# Patient Record
Sex: FEMALE | Race: WHITE | URBAN - METROPOLITAN AREA
[De-identification: names, ages, dates, MRNs, and addresses within clinical notes are randomized per-mention and may not be internally consistent; named-entity substitution may affect disease eponyms.]

---

## 2017-06-14 ENCOUNTER — OFFICE VISIT (OUTPATIENT)
Dept: INTERNAL MEDICINE | Facility: CLINIC | Age: 62
End: 2017-06-14
Payer: COMMERCIAL

## 2017-06-14 VITALS
HEART RATE: 91 BPM | BODY MASS INDEX: 18.37 KG/M2 | WEIGHT: 124 LBS | OXYGEN SATURATION: 97 % | DIASTOLIC BLOOD PRESSURE: 67 MMHG | SYSTOLIC BLOOD PRESSURE: 112 MMHG | HEIGHT: 69 IN | TEMPERATURE: 99.9 F

## 2017-06-14 DIAGNOSIS — K21.9 GASTROESOPHAGEAL REFLUX DISEASE, ESOPHAGITIS PRESENCE NOT SPECIFIED: ICD-10-CM

## 2017-06-14 DIAGNOSIS — F10.90 ALCOHOL USE DISORDER: ICD-10-CM

## 2017-06-14 DIAGNOSIS — F17.200 TOBACCO USE DISORDER: ICD-10-CM

## 2017-06-14 DIAGNOSIS — D53.9 MACROCYTIC ANEMIA: ICD-10-CM

## 2017-06-14 DIAGNOSIS — R79.89 ELEVATED LFTS: ICD-10-CM

## 2017-06-14 DIAGNOSIS — E87.6 HYPOKALEMIA: ICD-10-CM

## 2017-06-14 DIAGNOSIS — J40 BRONCHITIS: ICD-10-CM

## 2017-06-14 DIAGNOSIS — R60.0 BILATERAL LEG EDEMA: Primary | ICD-10-CM

## 2017-06-14 LAB
ALBUMIN SERPL-MCNC: 2.6 G/DL (ref 3.4–5)
ALP SERPL-CCNC: 229 U/L (ref 40–150)
ALT SERPL W P-5'-P-CCNC: 91 U/L (ref 0–50)
ANION GAP SERPL CALCULATED.3IONS-SCNC: 9 MMOL/L (ref 3–14)
AST SERPL W P-5'-P-CCNC: 77 U/L (ref 0–45)
BASOPHILS # BLD AUTO: 0.1 10E9/L (ref 0–0.2)
BASOPHILS NFR BLD AUTO: 1 %
BILIRUB SERPL-MCNC: 0.8 MG/DL (ref 0.2–1.3)
BUN SERPL-MCNC: 4 MG/DL (ref 7–30)
CALCIUM SERPL-MCNC: 8.5 MG/DL (ref 8.5–10.1)
CHLORIDE SERPL-SCNC: 106 MMOL/L (ref 94–109)
CO2 SERPL-SCNC: 25 MMOL/L (ref 20–32)
CREAT SERPL-MCNC: 0.53 MG/DL (ref 0.52–1.04)
DIFFERENTIAL METHOD BLD: ABNORMAL
EOSINOPHIL # BLD AUTO: 0.1 10E9/L (ref 0–0.7)
EOSINOPHIL NFR BLD AUTO: 1 %
ERYTHROCYTE [DISTWIDTH] IN BLOOD BY AUTOMATED COUNT: 12.5 % (ref 10–15)
GFR SERPL CREATININE-BSD FRML MDRD: ABNORMAL ML/MIN/1.7M2
GLUCOSE SERPL-MCNC: 95 MG/DL (ref 70–99)
HCT VFR BLD AUTO: 34.7 % (ref 35–47)
HGB BLD-MCNC: 11.5 G/DL (ref 11.7–15.7)
LYMPHOCYTES # BLD AUTO: 3.1 10E9/L (ref 0.8–5.3)
LYMPHOCYTES NFR BLD AUTO: 28 %
MCH RBC QN AUTO: 33.4 PG (ref 26.5–33)
MCHC RBC AUTO-ENTMCNC: 33.1 G/DL (ref 31.5–36.5)
MCV RBC AUTO: 101 FL (ref 78–100)
MONOCYTES # BLD AUTO: 2.1 10E9/L (ref 0–1.3)
MONOCYTES NFR BLD AUTO: 19 %
NEUTROPHILS # BLD AUTO: 5.8 10E9/L (ref 1.6–8.3)
NEUTROPHILS NFR BLD AUTO: 51 %
PLATELET # BLD AUTO: 190 10E9/L (ref 150–450)
PLATELET # BLD EST: NORMAL 10*3/UL
POTASSIUM SERPL-SCNC: 3.1 MMOL/L (ref 3.4–5.3)
PROT SERPL-MCNC: 6.6 G/DL (ref 6.8–8.8)
RBC # BLD AUTO: 3.44 10E12/L (ref 3.8–5.2)
SODIUM SERPL-SCNC: 140 MMOL/L (ref 133–144)
STOMATOCYTES BLD QL SMEAR: ABNORMAL
TSH SERPL DL<=0.005 MIU/L-ACNC: 1.39 MU/L (ref 0.4–4)
VARIANT LYMPHS BLD QL SMEAR: PRESENT
WBC # BLD AUTO: 11.2 10E9/L (ref 4–11)

## 2017-06-14 PROCEDURE — 36415 COLL VENOUS BLD VENIPUNCTURE: CPT | Performed by: INTERNAL MEDICINE

## 2017-06-14 PROCEDURE — 99203 OFFICE O/P NEW LOW 30 MIN: CPT | Performed by: INTERNAL MEDICINE

## 2017-06-14 PROCEDURE — 80050 GENERAL HEALTH PANEL: CPT | Performed by: INTERNAL MEDICINE

## 2017-06-14 RX ORDER — AZITHROMYCIN 250 MG/1
TABLET, FILM COATED ORAL
Qty: 6 TABLET | Refills: 0 | Status: SHIPPED | OUTPATIENT
Start: 2017-06-14 | End: 2017-07-24

## 2017-06-14 NOTE — NURSING NOTE
"Chief Complaint   Patient presents with     Edema     both feet     Cough     2 weeks     Ulcer     patient thinks she may have an ulcer.       Initial /67 (BP Location: Right arm, Patient Position: Chair, Cuff Size: Adult Regular)  Pulse 91  Temp 99.9  F (37.7  C) (Oral)  Ht 5' 9\" (1.753 m)  Wt 124 lb (56.2 kg)  SpO2 97%  BMI 18.31 kg/m2 Estimated body mass index is 18.31 kg/(m^2) as calculated from the following:    Height as of this encounter: 5' 9\" (1.753 m).    Weight as of this encounter: 124 lb (56.2 kg).  Medication Reconciliation: complete    Marina Greene CMA  "

## 2017-06-14 NOTE — MR AVS SNAPSHOT
After Visit Summary   6/14/2017    Haritha Denton    MRN: [id number]           Patient Information     Date Of Birth          1955        Visit Information        Provider Department      6/14/2017 2:20 PM Bozena Gregg MD [facility]        Today's Diagnoses     Bilateral leg edema    -  1    Bronchitis          Care Instructions    We will let you know your test results as discussed: by phone for urgent results, otherwise by postal mail.  I strongly urge you to cut back on your alcohol use (please do so gradually).   Please take the antibiotics and let us know if you are not better after 5 complete days on it.    For the feeling of pain which reminds you of your ulcer: please take over the counter Zantac at a dose of 300mg at bedtime for at least 2 weeks (and ideally for 8 weeks).    Please return to clinic in 2 months when your medical records arrive (we may advise a visit sooner, based on today's lab/test results).              Follow-ups after your visit        Who to contact     If you have questions or need follow up information about today's clinic visit or your schedule please contact [facility] directly at [phone].  Normal or non-critical lab and imaging results will be communicated to you by MyChart, letter or phone within 4 business days after the clinic has received the results. If you do not hear from us within 7 days, please contact the clinic through MyChart or phone. If you have a critical or abnormal lab result, we will notify you by phone as soon as possible.  Submit refill requests through [employer] or call your pharmacy and they will forward the refill request to us. Please allow 3 business days for your refill to be completed.          Additional Information About Your Visit        MyChart Information     [employer] lets you send messages to your doctor, view your test results, renew your prescriptions, schedule appointments and more.  "To sign up, go to [email or web address] . Click on \"Log in\" on the left side of the screen, which will take you to the Welcome page. Then click on \"Sign up Now\" on the right side of the page.     You will be asked to enter the access code listed below, as well as some personal information. Please follow the directions to create your username and password.     Your access code is: [email or web address]  Expires: 2017  3:43 PM     Your access code will  in 90 days. If you need help or a new code, please call your [place] clinic or [phone].        Care EveryWhere ID     This is your Care EveryWhere ID. This could be used by other organizations to access your [place] medical records  [id number]        Your Vitals Were     Pulse Temperature Height Pulse Oximetry BMI (Body Mass Index)       91 99.9  F (37.7  C) (Oral) 5' 9\" (1.753 m) 97% 18.31 kg/m2        Blood Pressure from Last 3 Encounters:   17 112/67    Weight from Last 3 Encounters:   17 124 lb (56.2 kg)              We Performed the Following     CBC with platelets and differential     Comprehensive metabolic panel     TSH with free T4 reflex          Today's Medication Changes          These changes are accurate as of: 17  3:43 PM.  If you have any questions, ask your nurse or doctor.               Start taking these medicines.        Dose/Directions    azithromycin 250 MG tablet   Commonly known as:  ZITHROMAX   Used for:  Bronchitis   Started by:  Bozena Gregg MD        Two tablets first day, then one tablet daily for four days.   Quantity:  6 tablet   Refills:  0            Where to get your medicines      These medications were sent to [facility] [id number]  [place] [address] [address] [facility]  [address] [facility] [address]     Phone:  [phone]     azithromycin 250 MG tablet                Primary Care Provider Office Phone # Fax #    [facility] [phone]       " [address] [address] [facility]. [facility] [address]        Thank you!     Thank you for choosing [facility]  for your care. Our goal is always to provide you with excellent care. Hearing back from our patients is one way we can continue to improve our services. Please take a few minutes to complete the written survey that you may receive in the mail after your visit with us. Thank you!             Your Updated Medication List - Protect others around you: Learn how to safely use, store and throw away your medicines at www.disposemymeds.org.          This list is accurate as of: 6/14/17  3:43 PM.  Always use your most recent med list.                   Brand Name Dispense Instructions for use    azithromycin 250 MG tablet    ZITHROMAX    6 tablet    Two tablets first day, then one tablet daily for four days.

## 2017-06-14 NOTE — PATIENT INSTRUCTIONS
We will let you know your test results as discussed: by phone for urgent results, otherwise by postal mail.  I strongly urge you to cut back on your alcohol use (please do so gradually).   Please take the antibiotics and let us know if you are not better after 5 complete days on it.    For the feeling of pain which reminds you of your ulcer: please take over the counter Zantac at a dose of 300mg at bedtime for at least 2 weeks (and ideally for 8 weeks).    Please return to clinic in 2 months when your medical records arrive (we may advise a visit sooner, based on today's lab/test results).

## 2017-06-14 NOTE — PROGRESS NOTES
"  SUBJECTIVE:                                                    Haritha Denton is a 61 year old female who presents to clinic today for the following health issues:    PMH of current ETOH overuse, longterm and current tobacco use, etc,     Patient is looking for a PCP, and transferring to this clinic as moved from [place], [place].    Edema      Duration: 1 week    Description (location/character/radiation): Both ankles    Intensity:  Mild no pain    Accompanying signs and symptoms: none    History (similar episodes/previous evaluation): None    Precipitating or alleviating factors: no changes they are the same all day    Therapies tried and outcome: None     ETOH: 6 pack a day.  For \"many years.\" No known history of liver disease.      ENT Symptoms             Symptoms: cc Present Absent Comment   Fever/Chills   x    Fatigue  x     Muscle Aches   x    Eye Irritation   x    Sneezing   x    Nasal Yordy/Drg  x     Sinus Pressure/Pain   x    Loss of smell   x    Dental pain   x    Sore Throat   x    Swollen Glands   x    Ear Pain/Fullness   x    Cough  x  Has had a hacking cough with clear sputum.   Wheeze   x    Chest Pain   x    Shortness of breath  x     Rash   x    Other   x      Symptom duration:  2 weeks   Symptom severity:  moderate   Treatments tried:  none   Contacts:  sister has same type of symptoms     Overall, the symptoms are about the same.    Note: patient has 40 packs yrs, current smoker, never had a CT.  No recent hemoptysis or weight loss.          ?Ulcer symptoms      Duration: 1 week    Description (location/character/radiation): when she coughs she has pains in the sides of her abdomen    Intensity:  4/10 in pain    Accompanying signs and symptoms: none    History (similar episodes/previous evaluation): had an ulcer in the past    Precipitating or alleviating factors: coughing    Therapies tried and outcome: None     No feeling of heartburn, but current symptoms remind patient of when she had an ulcer " "a year ago-she was given a medication for it-she believes that she was on it for 7-10 days.  She believes she had a bleeding ulcer.  ETOH: 6 pack a day.  For \"many years.\" No known history of liver disease.      Problem list and histories reviewed & adjusted, as indicated.  Additional history: as documented    Patient Active Problem List   Diagnosis     Bilateral leg edema     Bronchitis     Gastroesophageal reflux disease, esophagitis presence not specified     Alcohol use disorder (H)     Elevated LFTs     Macrocytic anemia     Tobacco use disorder     Hypokalemia     History reviewed. No pertinent surgical history.    Social History   Substance Use Topics     Smoking status: Current Every Day Smoker     Smokeless tobacco: Not on file     Alcohol use Yes     Family History   Problem Relation Age of Onset     HEART DISEASE Mother      DIABETES Mother      Arrhythmia Brother          No current outpatient prescriptions on file.       Reviewed and updated as needed this visit by clinical staff  Tobacco  Allergies  Meds  Med Hx  Surg Hx  Fam Hx  Soc Hx      Reviewed and updated as needed this visit by Provider         ==============================================================  ROS:  Constitutional, HEENT, cardiovascular, pulmonary, GI, , musculoskeletal, neuro, skin, endocrine and psych systems are negative, except as otherwise noted.       OBJECTIVE:                                                    /67 (BP Location: Right arm, Patient Position: Chair, Cuff Size: Adult Regular)  Pulse 91  Temp 99.9  F (37.7  C) (Oral)  Ht 5' 9\" (1.753 m)  Wt 124 lb (56.2 kg)  SpO2 97%  BMI 18.31 kg/m2  Body mass index is 18.31 kg/(m^2).     GENERAL APPEARANCE: healthy, alert and in no distress  EYES: Eyes grossly normal to inspection, and conjunctivae and sclerae normal  HENT: head normocephalic and atraumatic and mouth without ulcers or lesions, oropharynx clear and oral mucous membranes moist  NECK: no " noticeable adenopathy, no asymmetry, masses, or scars   RESP: lungs clear to auscultation - no rales, rhonchi or wheezes  CV: regular rate and rhythm, normal S1 S2, no S3 or S4, no murmur, click or rub, +3 peripheral edema most prominent in  The ankles and especially in the feet  ABDOMEN: soft, nontender, no hepatosplenomegaly, no masses and bowel sounds normal  MS: no musculoskeletal defects are noted and gait is age appropriate without ataxia  SKIN: no suspicious lesions or rashes  NEURO: mentation intact and speech normal  PSYCH: mentation appears normal and affect normal/bright.       Results for orders placed or performed in visit on 06/14/17   TSH with free T4 reflex   Result Value Ref Range    TSH 1.39 0.40 - 4.00 mU/L   Comprehensive metabolic panel   Result Value Ref Range    Sodium 140 133 - 144 mmol/L    Potassium 3.1 (L) 3.4 - 5.3 mmol/L    Chloride 106 94 - 109 mmol/L    Carbon Dioxide 25 20 - 32 mmol/L    Anion Gap 9 3 - 14 mmol/L    Glucose 95 70 - 99 mg/dL    Urea Nitrogen 4 (L) 7 - 30 mg/dL    Creatinine 0.53 0.52 - 1.04 mg/dL    GFR Estimate >90  Non  GFR Calc   >60 mL/min/1.7m2    GFR Estimate If Black >90   GFR Calc   >60 mL/min/1.7m2    Calcium 8.5 8.5 - 10.1 mg/dL    Bilirubin Total 0.8 0.2 - 1.3 mg/dL    Albumin 2.6 (L) 3.4 - 5.0 g/dL    Protein Total 6.6 (L) 6.8 - 8.8 g/dL    Alkaline Phosphatase 229 (H) 40 - 150 U/L    ALT 91 (H) 0 - 50 U/L    AST 77 (H) 0 - 45 U/L   CBC with platelets and differential   Result Value Ref Range    WBC 11.2 (H) 4.0 - 11.0 10e9/L    RBC Count 3.44 (L) 3.8 - 5.2 10e12/L    Hemoglobin 11.5 (L) 11.7 - 15.7 g/dL    Hematocrit 34.7 (L) 35.0 - 47.0 %     (H) 78 - 100 fl    MCH 33.4 (H) 26.5 - 33.0 pg    MCHC 33.1 31.5 - 36.5 g/dL    RDW 12.5 10.0 - 15.0 %    Platelet Count 190 150 - 450 10e9/L    % Neutrophils 51.0 %    % Lymphocytes 28.0 %    % Monocytes 19.0 %    % Eosinophils 1.0 %    % Basophils 1.0 %    Absolute Neutrophil  5.8 1.6 - 8.3 10e9/L    Absolute Lymphocytes 3.1 0.8 - 5.3 10e9/L    Absolute Monocytes 2.1 (H) 0.0 - 1.3 10e9/L    Absolute Eosinophils 0.1 0.0 - 0.7 10e9/L    Absolute Basophils 0.1 0.0 - 0.2 10e9/L    Stomatocytes Moderate     Reactive Lymphs Present     Platelet Estimate Normal     Diff Method Automated Method       ASSESSMENT/PLAN:                                                        ICD-10-CM    1. Bilateral leg edema R60.0 TSH with free T4 reflex     Comprehensive metabolic panel     CBC with platelets and differential   2. Bronchitis J40 azithromycin (ZITHROMAX) 250 MG tablet   3. Gastroesophageal reflux disease, esophagitis presence not specified K21.9    4. Alcohol use disorder (H) F10.99    5. Elevated LFTs R79.89    6. Hypokalemia E87.6    7. Macrocytic anemia D53.9    8. Tobacco use disorder F17.200      Time spent coordinating care was approximately 30 minutes out of a total of approximately 40 minutes (which was the total duration of the appointment) including the diagnosis, prognosis and treatment of the above medical conditions.     (R60.0) Bilateral leg edema  (primary encounter diagnosis)  Comment: most likely related to the patient's ETOH use (ie most likely liver cirrhosis; CHF (ex,  ETOH CDM vs ischemic CDM) is also a possibility   Plan:   As per orders above and patient instructions below.   See results message.   TSH with free T4 reflex, Comprehensive         metabolic panel, CBC with platelets and         differential            (J40) Bronchitis  Comment: as noted  Plan: azithromycin (ZITHROMAX) 250 MG tablet        As per orders above and patient instructions below.     Multiple derangements on basic labs today, all related to ETOH use:  ASSESSMENT: patient is precontemplative to ETOH use and her tobacco use  PLAN:  As per orders above and patient instructions below.   See results message. Will recheck potassium at next appointment            Patient Instructions   We will let you know  your test results as discussed: by phone for urgent results, otherwise by postal mail.  I strongly urge you to cut back on your alcohol use (please do so gradually).   Please take the antibiotics and let us know if you are not better after 5 complete days on it.    For the feeling of pain which reminds you of your ulcer: please take over the counter Zantac at a dose of 300mg at bedtime for at least 2 weeks (and ideally for 8 weeks).    Please return to clinic in 2 months when your medical records arrive (we may advise a visit sooner, based on today's lab/test results).                    Bozena Gregg MD  [facility]

## 2017-06-16 ENCOUNTER — TELEPHONE (OUTPATIENT)
Dept: INTERNAL MEDICINE | Facility: CLINIC | Age: 62
End: 2017-06-16

## 2017-06-16 DIAGNOSIS — E87.6 HYPOKALEMIA: Primary | ICD-10-CM

## 2017-06-16 RX ORDER — POTASSIUM CHLORIDE 750 MG/1
20 TABLET, EXTENDED RELEASE ORAL DAILY
Qty: 90 TABLET | Refills: 3 | Status: SHIPPED | OUTPATIENT
Start: 2017-06-16 | End: 2018-02-12

## 2017-06-16 NOTE — TELEPHONE ENCOUNTER
Please call and advise the patient as follows, and also send a [employer] message with the same text and attach recent test results [statements which are in bold and in square brackets, like this sentence, is for clinic staff and should not be included in the text of the letter to the patient]:    Dear Haritha,     Your blood potassium level is slightly low. Low potassium can cause muscle cramps, fatigue, and abnormal heart rhythms.  Please start taking potassium supplements (which I will send to your pharmacy).  Your liver numbers are high, and you have anemia-this, in addition to the low potassium and your heartburn are all consistent with overuse of alcohol.   Please try to cut down on alcohol gradually, ie, by 3 drinks every week and let me know if you are interested in a prescription drug called Naltrexone to help you quit drinking.    Otherwise, please continue the treatment plan that we discussed at your last clinic visit and let me know if you have any questions via [employer] or by calling [phone].    Bozena Gregg MD

## 2017-06-16 NOTE — LETTER
[facility]  [address] [address] [phone]        June 16, 2017    Haritha Denton  [address] [facility] [address]            Dear Haritha,    Your blood potassium level is slightly low. Low potassium can cause muscle cramps, fatigue, and abnormal heart rhythms.  Please start taking potassium supplements (which I will send to your pharmacy).  Your liver numbers are high, and you have anemia-this, in addition to the low potassium and your heartburn are all consistent with overuse of alcohol.   Please try to cut down on alcohol gradually, ie, by 3 drinks every week and let me know if you are interested in a prescription drug called Naltrexone to help you quit drinking.     Otherwise, please continue the treatment plan that we discussed at your last clinic visit and let me know if you have any questions via [employer] or by calling [phone].     Bozena Gregg MD      Results for orders placed or performed in visit on 06/14/17   TSH with free T4 reflex   Result Value Ref Range    TSH 1.39 0.40 - 4.00 mU/L   Comprehensive metabolic panel   Result Value Ref Range    Sodium 140 133 - 144 mmol/L    Potassium 3.1 (L) 3.4 - 5.3 mmol/L    Chloride 106 94 - 109 mmol/L    Carbon Dioxide 25 20 - 32 mmol/L    Anion Gap 9 3 - 14 mmol/L    Glucose 95 70 - 99 mg/dL    Urea Nitrogen 4 (L) 7 - 30 mg/dL    Creatinine 0.53 0.52 - 1.04 mg/dL    GFR Estimate >90  Non  GFR Calc   >60 mL/min/1.7m2    GFR Estimate If Black >90   GFR Calc   >60 mL/min/1.7m2    Calcium 8.5 8.5 - 10.1 mg/dL    Bilirubin Total 0.8 0.2 - 1.3 mg/dL    Albumin 2.6 (L) 3.4 - 5.0 g/dL    Protein Total 6.6 (L) 6.8 - 8.8 g/dL    Alkaline Phosphatase 229 (H) 40 - 150 U/L    ALT 91 (H) 0 - 50 U/L    AST 77 (H) 0 - 45 U/L   CBC with platelets and differential   Result Value Ref Range    WBC 11.2 (H) 4.0 - 11.0 10e9/L    RBC Count 3.44 (L) 3.8 - 5.2 10e12/L    Hemoglobin 11.5 (L) 11.7 -  15.7 g/dL    Hematocrit 34.7 (L) 35.0 - 47.0 %     (H) 78 - 100 fl    MCH 33.4 (H) 26.5 - 33.0 pg    MCHC 33.1 31.5 - 36.5 g/dL    RDW 12.5 10.0 - 15.0 %    Platelet Count 190 150 - 450 10e9/L    % Neutrophils 51.0 %    % Lymphocytes 28.0 %    % Monocytes 19.0 %    % Eosinophils 1.0 %    % Basophils 1.0 %    Absolute Neutrophil 5.8 1.6 - 8.3 10e9/L    Absolute Lymphocytes 3.1 0.8 - 5.3 10e9/L    Absolute Monocytes 2.1 (H) 0.0 - 1.3 10e9/L    Absolute Eosinophils 0.1 0.0 - 0.7 10e9/L    Absolute Basophils 0.1 0.0 - 0.2 10e9/L    Stomatocytes Moderate     Reactive Lymphs Present     Platelet Estimate Normal     Diff Method Automated Method

## 2017-06-16 NOTE — TELEPHONE ENCOUNTER
Patient returned call, informed pt of providers note as written below, pt verbalized good understanding.    She received a call from pharmacy, and plans to  the prescription.      Please see below, [facility] please send letter and results.    Thank you, Jenise Paul, TITAN RN

## 2017-06-16 NOTE — TELEPHONE ENCOUNTER
Left message for patient to call back on their VM. Writers direct line given, Delonte ADAMES [phone]. Jenise Paul RN

## 2017-06-16 NOTE — TELEPHONE ENCOUNTER
Patient returned call at 9:23 am and left a voicemail.   Left message for patient to call back on their VM. Writers direct line given, Delonte ADAMES [phone]  Asked patient if its okay to leave detailed message on her vm.   Jenise Paul RN

## 2017-06-18 ENCOUNTER — TELEPHONE (OUTPATIENT)
Dept: INTERNAL MEDICINE | Facility: CLINIC | Age: 62
End: 2017-06-18

## 2017-06-18 PROBLEM — J40 BRONCHITIS: Status: ACTIVE | Noted: 2017-06-18

## 2017-06-18 PROBLEM — F10.90 ALCOHOL USE DISORDER: Status: ACTIVE | Noted: 2017-06-18

## 2017-06-18 PROBLEM — D53.9 MACROCYTIC ANEMIA: Status: ACTIVE | Noted: 2017-06-18

## 2017-06-18 PROBLEM — F17.200 TOBACCO USE DISORDER: Status: ACTIVE | Noted: 2017-06-18

## 2017-06-18 PROBLEM — E87.6 HYPOKALEMIA: Status: ACTIVE | Noted: 2017-06-18

## 2017-06-18 PROBLEM — R79.89 ELEVATED LFTS: Status: ACTIVE | Noted: 2017-06-18

## 2017-06-18 NOTE — LETTER
[facility]  [address] [facility] [address] [phone]        June 20, 2017    Haritha Denton  [address] [facility] [address] [address]            Dear Haritha,    Your blood potassium level is low. Low potassium can cause muscle cramps, fatigue, and abnormal heart rhythms.  Please try to eat more foods which have a lot of potassium in them, like potatoes (baked), spinach (cooked), lentils (cooked), kidney beans (cooked), bananas, raisins, and orange juice.  We can recheck your potassium level in a few weeks, at your next clinic visit.  Your labs also show elevated liver numbers and a macrocytic (large red blood cell) anemia. All of these lab abnormalities are connected to alcohol use.  I strongly urge you to cut back on your alcohol use to prevent further liver and other damage.   Please let us know if you are interested in connecting with our  to learn about resources to help you quit.  I can also prescribe medications to make quitting easier.     Please call our clinic at [phone] if you have any questions.     Bozena Gregg MD/jeanna    Results for orders placed or performed in visit on 06/14/17   TSH with free T4 reflex   Result Value Ref Range    TSH 1.39 0.40 - 4.00 mU/L   Comprehensive metabolic panel   Result Value Ref Range    Sodium 140 133 - 144 mmol/L    Potassium 3.1 (L) 3.4 - 5.3 mmol/L    Chloride 106 94 - 109 mmol/L    Carbon Dioxide 25 20 - 32 mmol/L    Anion Gap 9 3 - 14 mmol/L    Glucose 95 70 - 99 mg/dL    Urea Nitrogen 4 (L) 7 - 30 mg/dL    Creatinine 0.53 0.52 - 1.04 mg/dL    GFR Estimate >90  Non  GFR Calc   >60 mL/min/1.7m2    GFR Estimate If Black >90   GFR Calc   >60 mL/min/1.7m2    Calcium 8.5 8.5 - 10.1 mg/dL    Bilirubin Total 0.8 0.2 - 1.3 mg/dL    Albumin 2.6 (L) 3.4 - 5.0 g/dL    Protein Total 6.6 (L) 6.8 - 8.8 g/dL    Alkaline Phosphatase 229 (H) 40 - 150 U/L    ALT 91 (H) 0 - 50 U/L    AST 77 (H) 0 - 45 U/L    CBC with platelets and differential   Result Value Ref Range    WBC 11.2 (H) 4.0 - 11.0 10e9/L    RBC Count 3.44 (L) 3.8 - 5.2 10e12/L    Hemoglobin 11.5 (L) 11.7 - 15.7 g/dL    Hematocrit 34.7 (L) 35.0 - 47.0 %     (H) 78 - 100 fl    MCH 33.4 (H) 26.5 - 33.0 pg    MCHC 33.1 31.5 - 36.5 g/dL    RDW 12.5 10.0 - 15.0 %    Platelet Count 190 150 - 450 10e9/L    % Neutrophils 51.0 %    % Lymphocytes 28.0 %    % Monocytes 19.0 %    % Eosinophils 1.0 %    % Basophils 1.0 %    Absolute Neutrophil 5.8 1.6 - 8.3 10e9/L    Absolute Lymphocytes 3.1 0.8 - 5.3 10e9/L    Absolute Monocytes 2.1 (H) 0.0 - 1.3 10e9/L    Absolute Eosinophils 0.1 0.0 - 0.7 10e9/L    Absolute Basophils 0.1 0.0 - 0.2 10e9/L    Stomatocytes Moderate     Reactive Lymphs Present     Platelet Estimate Normal     Diff Method Automated Method

## 2017-06-19 NOTE — TELEPHONE ENCOUNTER
Please call and advise the patient as follows, and also send a letter with the same text and attach recent test results [statements which are in bold and in square brackets, like this sentence, is for clinic staff and should not be included in the text of the letter to the patient]:      Dear Haritha,     Your blood potassium level is low. Low potassium can cause muscle cramps, fatigue, and abnormal heart rhythms.  Please try to eat more foods which have a lot of potassium in them, like potatoes (baked), spinach (cooked), lentils (cooked), kidney beans (cooked), bananas, raisins, and orange juice.  We can recheck your potassium level in a few weeks, at your next clinic visit.  Your labs also show elevated liver numbers and a macrocytic (large red blood cell) anemia. All of these lab abnormalities are connected to alcohol use.  I strongly urge you to cut back on your alcohol use to prevent further liver and other damage.   Please let us know if you are interested in connecting with our  to learn about resources to help you quit.  I can also prescribe medications to make quitting easier.    Please call our clinic at [phone] if you have any questions.    Bozena Gregg MD

## 2017-06-19 NOTE — TELEPHONE ENCOUNTER
Called patient, informed pt of providers note as written below, pt verbalized good understanding.    She declined setting up the 2 month appointment.       Please see below.    Thank you, TITA MarleyN RN

## 2017-06-28 PROBLEM — A04.8 H. PYLORI INFECTION: Status: ACTIVE | Noted: 2017-06-28

## 2017-07-24 ENCOUNTER — OFFICE VISIT (OUTPATIENT)
Dept: FAMILY MEDICINE | Facility: CLINIC | Age: 62
End: 2017-07-24
Payer: COMMERCIAL

## 2017-07-24 VITALS
TEMPERATURE: 97.7 F | WEIGHT: 122 LBS | OXYGEN SATURATION: 100 % | BODY MASS INDEX: 18.02 KG/M2 | SYSTOLIC BLOOD PRESSURE: 120 MMHG | HEART RATE: 109 BPM | DIASTOLIC BLOOD PRESSURE: 70 MMHG

## 2017-07-24 DIAGNOSIS — R74.8 ELEVATED LIVER ENZYMES: Primary | ICD-10-CM

## 2017-07-24 DIAGNOSIS — E87.6 HYPOKALEMIA: ICD-10-CM

## 2017-07-24 DIAGNOSIS — E88.09 HYPOALBUMINEMIA: ICD-10-CM

## 2017-07-24 DIAGNOSIS — Z12.31 ENCOUNTER FOR SCREENING MAMMOGRAM FOR BREAST CANCER: ICD-10-CM

## 2017-07-24 DIAGNOSIS — Z23 NEED FOR DIPHTHERIA-TETANUS-PERTUSSIS (TDAP) VACCINE: ICD-10-CM

## 2017-07-24 DIAGNOSIS — Z11.59 NEED FOR HEPATITIS C SCREENING TEST: ICD-10-CM

## 2017-07-24 DIAGNOSIS — R60.0 PEDAL EDEMA: ICD-10-CM

## 2017-07-24 LAB
ANION GAP SERPL CALCULATED.3IONS-SCNC: 10 MMOL/L (ref 3–14)
BUN SERPL-MCNC: 13 MG/DL (ref 7–30)
CALCIUM SERPL-MCNC: 8.1 MG/DL (ref 8.5–10.1)
CHLORIDE SERPL-SCNC: 107 MMOL/L (ref 94–109)
CO2 SERPL-SCNC: 21 MMOL/L (ref 20–32)
CREAT SERPL-MCNC: 0.64 MG/DL (ref 0.52–1.04)
GFR SERPL CREATININE-BSD FRML MDRD: ABNORMAL ML/MIN/1.7M2
GLUCOSE SERPL-MCNC: 130 MG/DL (ref 70–99)
POTASSIUM SERPL-SCNC: ABNORMAL MMOL/L (ref 3.4–5.3)
SODIUM SERPL-SCNC: 138 MMOL/L (ref 133–144)

## 2017-07-24 PROCEDURE — 86665 EPSTEIN-BARR CAPSID VCA: CPT | Performed by: FAMILY MEDICINE

## 2017-07-24 PROCEDURE — 86803 HEPATITIS C AB TEST: CPT | Performed by: FAMILY MEDICINE

## 2017-07-24 PROCEDURE — 36415 COLL VENOUS BLD VENIPUNCTURE: CPT | Performed by: FAMILY MEDICINE

## 2017-07-24 PROCEDURE — 90715 TDAP VACCINE 7 YRS/> IM: CPT | Performed by: FAMILY MEDICINE

## 2017-07-24 PROCEDURE — 87340 HEPATITIS B SURFACE AG IA: CPT | Performed by: FAMILY MEDICINE

## 2017-07-24 PROCEDURE — 99214 OFFICE O/P EST MOD 30 MIN: CPT | Mod: 25 | Performed by: FAMILY MEDICINE

## 2017-07-24 PROCEDURE — 80048 BASIC METABOLIC PNL TOTAL CA: CPT | Performed by: FAMILY MEDICINE

## 2017-07-24 PROCEDURE — 86706 HEP B SURFACE ANTIBODY: CPT | Performed by: FAMILY MEDICINE

## 2017-07-24 PROCEDURE — 86645 CMV ANTIBODY IGM: CPT | Performed by: FAMILY MEDICINE

## 2017-07-24 PROCEDURE — 90471 IMMUNIZATION ADMIN: CPT | Performed by: FAMILY MEDICINE

## 2017-07-24 NOTE — MR AVS SNAPSHOT
After Visit Summary   7/24/2017    Haritha Denton    MRN: [id number]           Patient Information     Date Of Birth          1955        Visit Information        Provider Department      7/24/2017 9:20 AM Roxanne Luu MD [facility]        Today's Diagnoses     Elevated liver enzymes    -  1    Hypokalemia        Hypoalbuminemia        Pedal edema        Need for hepatitis C screening test        Encounter for screening mammogram for breast cancer        Need for diphtheria-tetanus-pertussis (Tdap) vaccine           Follow-ups after your visit        Your next 10 appointments already scheduled     Sep 12, 2017 10:20 AM CDT   PHYSICAL with Roxanne Luu MD   [facility] ([facility])    [address] [address] [phone]              Future tests that were ordered for you today     Open Future Orders        Priority Expected Expires Ordered    MA Screening Digital Bilateral Routine  7/21/2018 7/24/2017    US Abdomen Complete Routine  7/24/2018 7/24/2017            Who to contact     If you have questions or need follow up information about today's clinic visit or your schedule please contact [facility] directly at [phone].  Normal or non-critical lab and imaging results will be communicated to you by MyChart, letter or phone within 4 business days after the clinic has received the results. If you do not hear from us within 7 days, please contact the clinic through [employer] or phone. If you have a critical or abnormal lab result, we will notify you by phone as soon as possible.  Submit refill requests through [employer] or call your pharmacy and they will forward the refill request to us. Please allow 3 business days for your refill to be completed.          Additional Information About Your Visit        MyChart Information     [employer] lets you send messages to your doctor, view your test results, renew your  "prescriptions, schedule appointments and more. To sign up, go to [email or web address] . Click on \"Log in\" on the left side of the screen, which will take you to the Welcome page. Then click on \"Sign up Now\" on the right side of the page.     You will be asked to enter the access code listed below, as well as some personal information. Please follow the directions to create your username and password.     Your access code is: [email or web address]  Expires: 2017  3:43 PM     Your access code will  in 90 days. If you need help or a new code, please call your [place] clinic or [phone].        Care EveryWhere ID     This is your Care EveryWhere ID. This could be used by other organizations to access your [place] medical records  [id number]        Your Vitals Were     Pulse Temperature Pulse Oximetry BMI (Body Mass Index)          109 97.7  F (36.5  C) (Oral) 100% 18.02 kg/m2         Blood Pressure from Last 3 Encounters:   17 120/70   17 112/67    Weight from Last 3 Encounters:   17 122 lb (55.3 kg)   17 124 lb (56.2 kg)              We Performed the Following     **Hepatitis C Screen Reflex to RNA FUTURE anytime     Basic metabolic panel     CMV antibody IgM     EBV Capsid Antibody IgM     Hepatitis B Surface Antibody     Hepatitis B surface antigen     TDAP (ADACEL)        Primary Care Provider Office Phone # Fax #    [facility] [phone] [address] [facility]. [facility] [address]        Equal Access to Services     KIMI CAMACHO : Hadii kate wright hadasho Soradhaali, waaxda luqadaha, qaybta kaalmada kiarra gomes . So [facility] [phone].    ATENCIÓN: Si habla español, tiene a zambrano disposición servicios gratuitos de asistencia lingüística. Llame al [phone].    We comply with applicable federal civil rights laws and [place] laws. We do not discriminate on the basis of race, color, national origin, age, disability " sex, sexual orientation or gender identity.            Thank you!     [facility] you for [facility]  [facility]. Our goal is always to provide you with excellent care. Hearing back from our patients is one way we can continue to improve our services. Please take a few minutes to complete the written survey that you may receive in the mail after your visit with us. Thank you!             Your Updated Medication List - Protect others around you: Learn how to safely use, store and throw away your medicines at www.disposemymeds.org.          This list is accurate as of: 7/24/17  2:58 PM.  Always use your most recent med list.                   Brand Name Dispense Instructions for use Diagnosis    potassium chloride SA 10 MEQ CR tablet    K-DUR/KLOR-CON M    90 tablet    Take 2 tablets (20 mEq) by mouth daily    Hypokalemia

## 2017-07-24 NOTE — NURSING NOTE
Immunization History   Administered Date(s) Administered     TD (ADULT, 7+) 01/01/1998     Screening Questionnaire for Adult Immunization    Are you sick today?   No   Do you have allergies to medications, food, a vaccine component or latex?   No   Have you ever had a serious reaction after receiving a vaccination?   No   Do you have a long-term health problem with heart disease, lung disease, asthma, kidney disease, metabolic disease (e.g. diabetes), anemia, or other blood disorder?   No   Do you have cancer, leukemia, HIV/AIDS, or any other immune system problem?   No   In the past 3 months, have you taken medications that affect  your immune system, such as prednisone, other steroids, or anticancer drugs; drugs for the treatment of rheumatoid arthritis, Crohn s disease, or psoriasis; or have you had radiation treatments?   No   Have you had a seizure, or a brain or other nervous system problem?   No   During the past year, have you received a transfusion of blood or blood     products, or been given immune (gamma) globulin or antiviral drug?   No   For women: Are you pregnant or is there a chance you could become        pregnant during the next month?   No   Have you received any vaccinations in the past 4 weeks?   No     Immunization questionnaire answers were all negative.      MNVFC doesn't apply on this patient    Per orders of Dr. meehan, injection of tdap given by Redd Sparrow. Patient instructed to remain in clinic for 15 minutes afterwards, and to report any adverse reaction to me immediately.       Screening performed by Redd Sparrow on 7/24/2017 at 10:08 AM.

## 2017-07-24 NOTE — LETTER
[facility]  [address] [facility] [address] [phone]        July 27, 2017    Haritha Denton  [address] [address]          Dear Haritha,    Your tests for Hepatitis B and C along with the tests for the CMV and EBV viruses are all negative  They are not the cause of your elevated liver enzymes  Your electrolytes and kidney function are stable  Unfortunately they were unable to do your potassium. Will have to redraw that at your next visit.  Please schedule the abdominal ultrasound if you have not already.    Roxanne Benjamin MD/adelaida      Results for orders placed or performed in visit on 07/24/17   Basic metabolic panel   Result Value Ref Range    Sodium 138 133 - 144 mmol/L    Potassium  3.4 - 5.3 mmol/L     Canceled, Test credited   Unsatisfactory specimen - hemolyzed      Chloride 107 94 - 109 mmol/L    Carbon Dioxide 21 20 - 32 mmol/L    Anion Gap 10 3 - 14 mmol/L    Glucose 130 (H) 70 - 99 mg/dL    Urea Nitrogen 13 7 - 30 mg/dL    Creatinine 0.64 0.52 - 1.04 mg/dL    GFR Estimate >90  Non  GFR Calc   >60 mL/min/1.7m2    GFR Estimate If Black >90   GFR Calc   >60 mL/min/1.7m2    Calcium 8.1 (L) 8.5 - 10.1 mg/dL   **Hepatitis C Screen Reflex to RNA FUTURE anytime   Result Value Ref Range    Hepatitis C Antibody  NR     Nonreactive   Assay performance characteristics have not been established for newborns,   infants, and children     Hepatitis B Surface Antibody   Result Value Ref Range    Hepatitis B Surface Antibody 0.00 <8.00 m[IU]/mL   Hepatitis B surface antigen   Result Value Ref Range    Hep B Surface Agn Nonreactive NR   CMV antibody IgM   Result Value Ref Range    CMV Antibody IgM  0.0 - 0.8 AI     <0.2  Negative   Antibody index (AI) values reflect qualitative changes in antibody   concentration that cannot be directly associated with clinical condition or   disease state.     EBV Capsid Antibody IgM   Result Value Ref Range     EBV Capsid Antibody IgM  0.0 - 0.8 AI     <0.2  No detectable antibody.   Antibody index (AI) values reflect qualitative changes in antibody   concentration that cannot be directly associated with clinical condition or   disease state.

## 2017-07-24 NOTE — NURSING NOTE
"Chief Complaint   Patient presents with     Swelling     ankles       Initial /70  Pulse 109  Temp 97.7  F (36.5  C) (Oral)  Wt 122 lb (55.3 kg)  SpO2 100%  BMI 18.02 kg/m2 Estimated body mass index is 18.02 kg/(m^2) as calculated from the following:    Height as of 6/14/17: 5' 9\" (1.753 m).    Weight as of this encounter: 122 lb (55.3 kg).  Medication Reconciliation: ney Dangelo MA      "

## 2017-07-24 NOTE — PROGRESS NOTES
SUBJECTIVE:                                                    Haritha Denton is a 61 year old female who presents to clinic today for the following health issues:        Swelling in ankles with pain up to mid calf x 2 weeks      Amount of exercise or physical activity: None    Problems taking medications regularly: No    Medication side effects: none    Diet: regular (no restrictions)    Pt here to fu bilateral pedal edema  Reviewed labs. Edema due to low blood protein  This is probably secondary to etoh use and/or poor nutrition.   Pt has not cut down and does not want medication to help her cut down.  She is elevating her legs to try to help with edema  Has elevated liver enzymes, probably due to etoh use as well. Will rule out other causes.     Pt overdue for mammogram and pap. Pt scheduled for both    Pt also noted to have low potassium. Was given replacement. Will recheck today        Problem list and histories reviewed & adjusted, as indicated.  Additional history: as documented    Labs reviewed in EPIC    Reviewed and updated as needed this visit by clinical staffAllergies       Reviewed and updated as needed this visit by Provider         ROS:  Constitutional, HEENT, cardiovascular, pulmonary, gi and gu systems are negative, except as otherwise noted.      OBJECTIVE:   /70  Pulse 109  Temp 97.7  F (36.5  C) (Oral)  Wt 122 lb (55.3 kg)  SpO2 100%  BMI 18.02 kg/m2  Body mass index is 18.02 kg/(m^2).  GENERAL: healthy, alert and no distress  RESP: lungs clear to auscultation - no rales, rhonchi or wheezes  CV: regular rate and rhythm, normal S1 S2, no S3 or S4, no murmur, click or rub, no peripheral edema and peripheral pulses strong  ABDOMEN: soft, nontender, no hepatosplenomegaly, no masses and bowel sounds normal  MS: no gross musculoskeletal defects noted, 2+  Edema in ankles, less so in lower legs    Diagnostic Test Results:  none     ASSESSMENT/PLAN:     1. Elevated liver enzymes  As above,  complete work up  - US Abdomen Complete; Future  - **Hepatitis C Screen Reflex to RNA FUTURE anytime  - Hepatitis B Surface Antibody  - Hepatitis B surface antigen  - CMV antibody IgM  - EBV Capsid Antibody IgM    2. Hypokalemia  replac and recheck  - Basic metabolic panel    3. Hypoalbuminemia  As above    4. Pedal edema  As above    5. Need for hepatitis C screening test      6. Encounter for screening mammogram for breast cancer  scheduled  - MA Screening Digital Bilateral; Future    7. Need for diphtheria-tetanus-pertussis (Tdap) vaccine  given  - TDAP (ADACEL)        Roxanne Benjamin MD  [facility]

## 2017-07-25 LAB
CMV IGM SERPL QL IA: NORMAL AI (ref 0–0.8)
EBV VCA IGM SER QL IA: NORMAL AI (ref 0–0.8)
HBV SURFACE AB SERPL IA-ACNC: 0 M[IU]/ML
HBV SURFACE AG SERPL QL IA: NONREACTIVE
HCV AB SERPL QL IA: NORMAL

## 2017-09-12 ENCOUNTER — OFFICE VISIT (OUTPATIENT)
Dept: FAMILY MEDICINE | Facility: CLINIC | Age: 62
End: 2017-09-12
Payer: COMMERCIAL

## 2017-09-12 VITALS
BODY MASS INDEX: 18.02 KG/M2 | HEART RATE: 77 BPM | DIASTOLIC BLOOD PRESSURE: 74 MMHG | WEIGHT: 122 LBS | TEMPERATURE: 97.5 F | SYSTOLIC BLOOD PRESSURE: 120 MMHG

## 2017-09-12 DIAGNOSIS — Z12.4 SCREENING FOR MALIGNANT NEOPLASM OF CERVIX: ICD-10-CM

## 2017-09-12 DIAGNOSIS — Z13.220 SCREENING CHOLESTEROL LEVEL: ICD-10-CM

## 2017-09-12 DIAGNOSIS — T14.8XXA BRUISING: ICD-10-CM

## 2017-09-12 DIAGNOSIS — F17.200 TOBACCO USE DISORDER: ICD-10-CM

## 2017-09-12 DIAGNOSIS — F10.90 ALCOHOL USE DISORDER: ICD-10-CM

## 2017-09-12 DIAGNOSIS — E87.6 HYPOKALEMIA: ICD-10-CM

## 2017-09-12 DIAGNOSIS — R60.0 BILATERAL LEG EDEMA: ICD-10-CM

## 2017-09-12 DIAGNOSIS — Z12.31 ENCOUNTER FOR SCREENING MAMMOGRAM FOR BREAST CANCER: ICD-10-CM

## 2017-09-12 DIAGNOSIS — Z00.00 ROUTINE GENERAL MEDICAL EXAMINATION AT A HEALTH CARE FACILITY: Primary | ICD-10-CM

## 2017-09-12 LAB
ANION GAP SERPL CALCULATED.3IONS-SCNC: 12 MMOL/L (ref 3–14)
APTT PPP: 33 SEC (ref 22–37)
BUN SERPL-MCNC: 14 MG/DL (ref 7–30)
CALCIUM SERPL-MCNC: 8.1 MG/DL (ref 8.5–10.1)
CHLORIDE SERPL-SCNC: 110 MMOL/L (ref 94–109)
CHOLEST SERPL-MCNC: 200 MG/DL
CO2 SERPL-SCNC: 21 MMOL/L (ref 20–32)
CREAT SERPL-MCNC: 0.62 MG/DL (ref 0.52–1.04)
ERYTHROCYTE [DISTWIDTH] IN BLOOD BY AUTOMATED COUNT: 14.9 % (ref 10–15)
GFR SERPL CREATININE-BSD FRML MDRD: >90 ML/MIN/1.7M2
GLUCOSE SERPL-MCNC: 100 MG/DL (ref 70–99)
HCT VFR BLD AUTO: 34.7 % (ref 35–47)
HDLC SERPL-MCNC: 40 MG/DL
HGB BLD-MCNC: 11.6 G/DL (ref 11.7–15.7)
INR PPP: 1.29 (ref 0.86–1.14)
LDLC SERPL CALC-MCNC: ABNORMAL MG/DL
LDLC SERPL DIRECT ASSAY-MCNC: 69 MG/DL
MCH RBC QN AUTO: 35.7 PG (ref 26.5–33)
MCHC RBC AUTO-ENTMCNC: 33.4 G/DL (ref 31.5–36.5)
MCV RBC AUTO: 107 FL (ref 78–100)
NONHDLC SERPL-MCNC: 160 MG/DL
PLATELET # BLD AUTO: 180 10E9/L (ref 150–450)
POTASSIUM SERPL-SCNC: 4 MMOL/L (ref 3.4–5.3)
RBC # BLD AUTO: 3.25 10E12/L (ref 3.8–5.2)
SODIUM SERPL-SCNC: 143 MMOL/L (ref 133–144)
TRIGL SERPL-MCNC: 772 MG/DL
WBC # BLD AUTO: 5.5 10E9/L (ref 4–11)

## 2017-09-12 PROCEDURE — 80061 LIPID PANEL: CPT | Performed by: FAMILY MEDICINE

## 2017-09-12 PROCEDURE — 36415 COLL VENOUS BLD VENIPUNCTURE: CPT | Performed by: FAMILY MEDICINE

## 2017-09-12 PROCEDURE — G0476 HPV COMBO ASSAY CA SCREEN: HCPCS | Performed by: FAMILY MEDICINE

## 2017-09-12 PROCEDURE — 85027 COMPLETE CBC AUTOMATED: CPT | Performed by: FAMILY MEDICINE

## 2017-09-12 PROCEDURE — 85730 THROMBOPLASTIN TIME PARTIAL: CPT | Performed by: FAMILY MEDICINE

## 2017-09-12 PROCEDURE — 99396 PREV VISIT EST AGE 40-64: CPT | Performed by: FAMILY MEDICINE

## 2017-09-12 PROCEDURE — 83721 ASSAY OF BLOOD LIPOPROTEIN: CPT | Mod: 59 | Performed by: FAMILY MEDICINE

## 2017-09-12 PROCEDURE — 80048 BASIC METABOLIC PNL TOTAL CA: CPT | Performed by: FAMILY MEDICINE

## 2017-09-12 PROCEDURE — G0145 SCR C/V CYTO,THINLAYER,RESCR: HCPCS | Performed by: FAMILY MEDICINE

## 2017-09-12 PROCEDURE — 85610 PROTHROMBIN TIME: CPT | Performed by: FAMILY MEDICINE

## 2017-09-12 NOTE — LETTER
September 13, 2017    Haritha Denton  [address] [address]            Dear Haritha,    Your electrolytes, including your potassium, is normal.   Your cholesterol is okay except for your elevated triglycerides. A healthy diet and exercise could help to improve this.  Your elevated INR suggests your liver is having trouble making clotting factors which would explain your easy bruising.  Cutting down on alcohol could help improve this.   Your hemoglobin is a bit low, probably due to low B12. This again is due to alcohol consumption.  Please let us know if you are ready to quit and need help to quit.    If you have any questions or concerns, please call myself or my nurse at [phone].    Sincerely,    Roxanne Benjamin MD/adelaida      Results for orders placed or performed in visit on 09/12/17   CBC with platelets   Result Value Ref Range    WBC 5.5 4.0 - 11.0 10e9/L    RBC Count 3.25 (L) 3.8 - 5.2 10e12/L    Hemoglobin 11.6 (L) 11.7 - 15.7 g/dL    Hematocrit 34.7 (L) 35.0 - 47.0 %     (H) 78 - 100 fl    MCH 35.7 (H) 26.5 - 33.0 pg    MCHC 33.4 31.5 - 36.5 g/dL    RDW 14.9 10.0 - 15.0 %    Platelet Count 180 150 - 450 10e9/L   INR   Result Value Ref Range    INR 1.29 (H) 0.86 - 1.14   Partial thromboplastin time   Result Value Ref Range    PTT 33 22 - 37 sec   Basic metabolic panel   Result Value Ref Range    Sodium 143 133 - 144 mmol/L    Potassium 4.0 3.4 - 5.3 mmol/L    Chloride 110 (H) 94 - 109 mmol/L    Carbon Dioxide 21 20 - 32 mmol/L    Anion Gap 12 3 - 14 mmol/L    Glucose 100 (H) 70 - 99 mg/dL    Urea Nitrogen 14 7 - 30 mg/dL    Creatinine 0.62 0.52 - 1.04 mg/dL    GFR Estimate >90 >60 mL/min/1.7m2    GFR Estimate If Black >90 >60 mL/min/1.7m2    Calcium 8.1 (L) 8.5 - 10.1 mg/dL   Lipid panel reflex to direct LDL   Result Value Ref Range    Cholesterol 200 (H) <200 mg/dL    Triglycerides 772 (H) <150 mg/dL    HDL Cholesterol 40 (L) >49 mg/dL    LDL Cholesterol Calculated  <100 mg/dL     Cannot  estimate LDL when triglyceride exceeds 400 mg/dL    Non HDL Cholesterol 160 (H) <130 mg/dL   LDL cholesterol direct   Result Value Ref Range    LDL Cholesterol Direct 69 <100 mg/dL

## 2017-09-12 NOTE — PROGRESS NOTES
SUBJECTIVE:   CC: Haritha Denton is an 61 year old woman who presents for preventive health visit.     Physical   Annual:     Getting at least 3 servings of Calcium per day::  Yes    Bi-annual eye exam::  NO    Dental care twice a year::  NO    Sleep apnea or symptoms of sleep apnea::  None    Diet::  Regular (no restrictions)    Frequency of exercise::  2-3 days/week    Duration of exercise::  30-45 minutes    Taking medications regularly::  Yes    Medication side effects::  None    Additional concerns today::  No    Pt with low potassium. Due for recheck. Unable to get level from last blood draw    Increased petechia and bruising noted over last few weeks. Petechia on lower legs mainly        Today's PHQ-2 Score:   PHQ-2 ( 1999 Pfizer) 9/12/2017   Q1: Little interest or pleasure in doing things 0   Q2: Feeling down, depressed or hopeless 0   PHQ-2 Score 0   Q1: Little interest or pleasure in doing things Not at all   Q2: Feeling down, depressed or hopeless Not at all   PHQ-2 Score 0       Abuse: Current or Past(Physical, Sexual or Emotional)- No  Do you feel safe in your environment - Yes    Social History   Substance Use Topics     Smoking status: Current Every Day Smoker     Smokeless tobacco: Never Used     Alcohol use Yes          Standardized Alcohol Screening Questionnaire  AUDIT   Questions 0 1 2 3 4 Score   1. How often do you have a drink  containing alcohol? Never Monthly or less 2 to 4  times a  month 2 to 3  times a  week 4 or more  times a  week  4   2. How many drinks containing alcohol  do you have on a typical day when you are drinking? 1 or 2 3 or 4 5 or 6 7 to 9 10 or more  2   3. How often do you have more than five  or more drinks on one occasion? Never Less  than  monthly Monthly Weekly Daily or  almost  daily  3   4. How often during the last year have  you found that you were not able to stop drinking once you had started? Never Less  than  monthly Monthly Weekly Daily or  almost  daily  0    5. How often during the last year have  you failed to do what was normally expected of you because of drinking? Never Less  than  monthly Monthly Weekly Daily or  almost  daily  0   6. How often during the last year have  you needed a first drink in the morning to get yourself going after a heavy drinking session? Never Less  than  monthly Monthly Weekly Daily or  almost  daily  0   7. How often during the last year have you had a feeling of guilt or remorse after drinking? Never Less  than  monthly Monthly Weekly Daily or  almost  daily  0   8. How often during the last year have  you been unable to remember what happened the night before because of your drinking? Never Less  than  monthly Monthly Weekly Daily or  almost  daily  0   9. Have you or someone else been  injured because of your drinking? No  Yes, but not in the last year  Yes,  during the  last year  0   10. Has a relative, friend, doctor or other health care worker been concerned about your drinking or suggested you cut down? No  Yes, but not in the last year  Yes,  during the  last year  4   Total  13   Scoring: A score of 7 for adult men is an indication of hazardous drinking (risk for physical or physiological harm); a score of 8 or more is an indication of an alcohol use disorder. A score of 5 or more for adult women  is an indication of hazardous drinking or an alcohol use disorder.         Reviewed orders with patient.  Reviewed health maintenance and updated orders accordingly - Yes  Labs reviewed in [facility]    Patient over age 50, mutual decision to screen reflected in health maintenance.      Pertinent mammograms are reviewed under the imaging tab.  History of abnormal Pap smear: NO - age 30-65 PAP every 5 years with negative HPV co-testing recommended    Reviewed and updated as needed this visit by clinical staff  Tobacco  Allergies  Meds  Med Hx  Surg Hx  Fam Hx  Soc Hx        Reviewed and updated as needed this visit by Provider               ROS:  C: NEGATIVE for fever, chills, change in weight  I: NEGATIVE for worrisome rashes, moles or lesions  E: NEGATIVE for vision changes or irritation  ENT: NEGATIVE for ear, mouth and throat problems  R: NEGATIVE for significant cough or SOB  B: NEGATIVE for masses, tenderness or discharge  CV: NEGATIVE for chest pain, palpitations or peripheral edema  GI: NEGATIVE for nausea, abdominal pain, heartburn, or change in bowel habits  : NEGATIVE for unusual urinary or vaginal symptoms. No vaginal bleeding.  M: NEGATIVE for significant arthralgias or myalgia  N: NEGATIVE for weakness, dizziness or paresthesias  P: NEGATIVE for changes in mood or affect      OBJECTIVE:   /74  Pulse 77  Temp 97.5  F (36.4  C) (Oral)  Wt 122 lb (55.3 kg)  BMI 18.02 kg/m2  EXAM:  GENERAL APPEARANCE: healthy, alert and no distress  EYES: Eyes grossly normal to inspection, PERRL and conjunctivae and sclerae normal  HENT: ear canals and TM's normal, nose and mouth without ulcers or lesions, oropharynx clear and oral mucous membranes moist  NECK: no adenopathy, no asymmetry, masses, or scars and thyroid normal to palpation  RESP: lungs clear to auscultation - no rales, rhonchi or wheezes  BREAST: normal without masses, tenderness or nipple discharge and no palpable axillary masses or adenopathy  CV: regular rate and rhythm, normal S1 S2, no S3 or S4, no murmur, click or rub, no peripheral edema and peripheral pulses strong  ABDOMEN: soft, nontender, no hepatosplenomegaly, no masses and bowel sounds normal   (female): normal female external genitalia, normal urethral meatus, vaginal mucosal atrophy noted, normal cervix, adnexae, and uterus without masses or abnormal discharge  MS: no musculoskeletal defects are noted and gait is age appropriate without ataxia  SKIN: no suspicious lesions or rashes, numerous petechiae noted over lower extremities  NEURO: Normal strength and tone, sensory exam grossly normal, mentation intact  "and speech normal  PSYCH: mentation appears normal and affect normal/bright    ASSESSMENT/PLAN:   1. Routine general medical examination at a health care facility      2. Alcohol use disorder (H)  Encouraged cessation,     3. Bilateral leg edema  Due to low protein levels due to etoh use. Awaiting abd us for liver assessment    4. Bruising    - CBC with platelets  - INR  - Partial thromboplastin time    5. Hypokalemia  recheck  - Basic metabolic panel    6. Tobacco use disorder  Encouraged cessation. She declined lung cancer screening with lung CT    7. Encounter for screening mammogram for breast cancer  Already scheduled    8. Screening for malignant neoplasm of cervix  completed  - Pap imaged thin layer screen with HPV - recommended age 30 - 65 years (select HPV order below)  - HPV High Risk Types DNA Cervical    9. Screening cholesterol level    - Lipid panel reflex to direct LDL    COUNSELING:  Reviewed preventive health counseling, as reflected in patient instructions       Regular exercise       Healthy diet/nutrition       Vision screening       Osteoporosis Prevention/Bone Health         reports that she has been smoking.  She has never used smokeless tobacco.  Tobacco Cessation Action Plan: Information offered: Patient not interested at this time  Estimated body mass index is 18.02 kg/(m^2) as calculated from the following:    Height as of 6/14/17: 5' 9\" (1.753 m).    Weight as of this encounter: 122 lb (55.3 kg).   Weight management plan noted, stable and monitoring    Counseling Resources:  ATP IV Guidelines  Pooled Cohorts Equation Calculator  Breast Cancer Risk Calculator  FRAX Risk Assessment  ICSI Preventive Guidelines  Dietary Guidelines for Americans, 2010  [employer]'s MyPlate  ASA Prophylaxis  Lung CA Screening    Roxanne Benjamin MD  [facility] [place]  Answers for HPI/ROS submitted by the patient on 9/12/2017   PHQ-2 Score: 0    "

## 2017-09-12 NOTE — NURSING NOTE
"Chief Complaint   Patient presents with     Physical       Initial /74  Pulse 77  Temp 97.5  F (36.4  C) (Oral)  Wt 122 lb (55.3 kg)  BMI 18.02 kg/m2 Estimated body mass index is 18.02 kg/(m^2) as calculated from the following:    Height as of 6/14/17: 5' 9\" (1.753 m).    Weight as of this encounter: 122 lb (55.3 kg).  Medication Reconciliation: ney Dangelo MA      "

## 2017-09-12 NOTE — MR AVS SNAPSHOT
After Visit Summary   9/12/2017    Haritha Denton    MRN: [id number]           Patient Information     Date Of Birth          1955        Visit Information        Provider Department      9/12/2017 10:20 AM Roxanne Luu MD [facility]        Today's Diagnoses     Routine general medical examination at a health care facility    -  1    Alcohol use disorder (H)        Bilateral leg edema        Bruising        Hypokalemia        Tobacco use disorder        Encounter for screening mammogram for breast cancer        Screening for malignant neoplasm of cervix        Screening cholesterol level          Care Instructions      Preventive Health Recommendations  Female Ages 50 - 64    Yearly exam: See your health care provider every year in order to  o Review health changes.   o Discuss preventive care.    o Review your medicines if your doctor has prescribed any.      Get a Pap test every three years (unless you have an abnormal result and your provider advises testing more often).    If you get Pap tests with HPV test, you only need to test every 5 years, unless you have an abnormal result.     You do not need a Pap test if your uterus was removed (hysterectomy) and you have not had cancer.    You should be tested each year for STDs (sexually transmitted diseases) if you're at risk.     Have a mammogram every 1 to 2 years.    Have a colonoscopy at age 50, or have a yearly FIT test (stool test). These exams screen for colon cancer.      Have a cholesterol test every 5 years, or more often if advised.    Have a diabetes test (fasting glucose) every three years. If you are at risk for diabetes, you should have this test more often.     If you are at risk for osteoporosis (brittle bone disease), think about having a bone density scan (DEXA).    Shots: Get a flu shot each year. Get a tetanus shot every 10 years.    Nutrition:     Eat at least 5 servings of fruits and vegetables each day.    Eat  whole-grain bread, whole-wheat pasta and brown rice instead of white grains and rice.    Talk to your provider about Calcium and Vitamin D.     Lifestyle    Exercise at least 150 minutes a week (30 minutes a day, 5 days a week). This will help you control your weight and prevent disease.    Limit alcohol to one drink per day.    No smoking.     Wear sunscreen to prevent skin cancer.     See your dentist every six months for an exam and cleaning.    See your eye doctor every 1 to 2 years.            Follow-ups after your visit        Your next 10 appointments already scheduled     Sep 14, 2017 10:00 AM CDT   US ABDOMEN COMPLETE with ANDUS1   [facility]    [address] [address] [phone]           Please bring a list of your medicines (including vitamins, minerals and over-the-counter drugs). Also, tell your doctor about any allergies you may have. Wear comfortable clothes and leave your valuables at home.  Adults: No eating or drinking for 8 hours before the exam. You may take medicine with a small sip of water.  Children: - Children 6+ years: No food or drink for 6 hours before exam. - Children 1-5 years: No food or drink for 4 hours before exam. - Infants, breast-fed: may have breast milk up to 2 hours before exam. - Infants, formula: may have bottle until 4 hours before exam.  Please call the Imaging Department at your exam site with any questions.            Sep 14, 2017 10:45 AM CDT   MA SCREENING DIGITAL BILATERAL with ANDMA1   [facility]    [address] [address] [phone]           Do not use any powder, lotion or deodorant under your arms or on your breast. If you do, we will ask you to remove it before your exam.  Wear comfortable, two-piece clothing.  If you have any allergies, tell your care team.  Bring any previous mammograms from other facilities or have them mailed to  "the breast center.              Who to contact     If you have questions or need follow up information about today's clinic visit or your schedule please contact [facility] directly at [phone].  Normal or non-critical lab and imaging results will be communicated to you by MyChart, letter or phone within 4 business days after the clinic has received the results. If you do not hear from us within 7 days, please contact the clinic through MyChart or phone. If you have a critical or abnormal lab result, we will notify you by phone as soon as possible.  Submit refill requests through [employer] or call your pharmacy and they will forward the refill request to us. Please allow 3 business days for your refill to be completed.          Additional Information About Your Visit        [facility] Information     [employer] lets you send messages to your doctor, view your test results, renew your prescriptions, schedule appointments and more. To sign up, go to [email or web address] . Click on \"Log in\" on the left side of the screen, which will take you to the Welcome page. Then click on \"Sign up Now\" on the right side of the page.     You will be asked to enter the access code listed below, as well as some personal information. Please follow the directions to create your username and password.     Your access code is: [email or web address]  Expires: 2017  3:43 PM     Your access code will  in 90 days. If you need help or a new code, please call your [place] clinic or [phone].        Care EveryWhere ID     This is your Care EveryWhere ID. This could be used by other organizations to access your [place] medical records  [id number]        Your Vitals Were     Pulse Temperature BMI (Body Mass Index)             77 97.5  F (36.4  C) (Oral) 18.02 kg/m2          Blood Pressure from Last 3 Encounters:   17 120/74   17 120/70   17 112/67    Weight from Last 3 Encounters:   17 122 lb (55.3 " kg)   07/24/17 122 lb (55.3 kg)   06/14/17 124 lb (56.2 kg)              We Performed the Following     Basic metabolic panel     CBC with platelets     HPV High Risk Types DNA Cervical     INR     Lipid panel reflex to direct LDL     Pap imaged thin layer screen with HPV - recommended age 30 - 65 years (select HPV order below)     Partial thromboplastin time        Primary Care Provider Office Phone # Fax #    [facility] [phone] [address] [facility] [address]        Equal Access to Services     KIMI CAMACHO : Hadii aad ku hadasho Soomaali, waaxda luqadaha, qaybta kaalmada adeegyada, waxay idiin hayaan antoni higgins . So [facility] [phone].    ATENCIÓN: Si habla español, tiene a zambrano disposición servicios gratuitos de asistencia lingüística. Vaniame al [phone].    We comply with applicable federal civil rights laws and [place] laws. We do not discriminate on the basis of race, color, national origin, age, disability sex, sexual orientation or gender identity.            Thank you!     Thank you for choosing [facility]  for your care. Our goal is always to provide you with excellent care. Hearing back from our patients is one way we can continue to improve our services. Please take a few minutes to complete the written survey that you may receive in the mail after your visit with us. Thank you!             Your Updated Medication List - Protect others around you: Learn how to safely use, store and throw away your medicines at www.disposemymeds.org.          This list is accurate as of: 9/12/17  1:07 PM.  Always use your most recent med list.                   Brand Name Dispense Instructions for use Diagnosis    potassium chloride SA 10 MEQ CR tablet    K-DUR/KLOR-CON M    90 tablet    Take 2 tablets (20 mEq) by mouth daily    Hypokalemia

## 2017-09-12 NOTE — LETTER
September 18, 2017    Haritha Denton  [address] [address]    Dear Haritha,  We are happy to inform you that your PAP smear result from 9/12/17 is normal.  We are now able to do a follow up test on PAP smears. The DNA test is for HPV (Human Papilloma Virus). Cervical cancer is closely linked with certain types of HPV. Your result showed no evidence of high risk HPV.  Therefore we recommend you return in 5 years for your next pap smear and HPV test.  You will still need to return to the clinic every year for an annual exam and other preventive tests.  Please contact the clinic at [phone] with any questions.  Sincerely,    Roxanne Benjamin MD/landry

## 2017-09-14 ENCOUNTER — RADIANT APPOINTMENT (OUTPATIENT)
Dept: ULTRASOUND IMAGING | Facility: CLINIC | Age: 62
End: 2017-09-14
Attending: FAMILY MEDICINE
Payer: COMMERCIAL

## 2017-09-14 ENCOUNTER — RADIANT APPOINTMENT (OUTPATIENT)
Dept: MAMMOGRAPHY | Facility: CLINIC | Age: 62
End: 2017-09-14
Attending: FAMILY MEDICINE
Payer: COMMERCIAL

## 2017-09-14 DIAGNOSIS — Z12.31 ENCOUNTER FOR SCREENING MAMMOGRAM FOR BREAST CANCER: ICD-10-CM

## 2017-09-14 DIAGNOSIS — R74.8 ELEVATED LIVER ENZYMES: ICD-10-CM

## 2017-09-14 LAB
COPATH REPORT: NORMAL
PAP: NORMAL

## 2017-09-14 PROCEDURE — 76700 US EXAM ABDOM COMPLETE: CPT

## 2017-09-14 PROCEDURE — G0202 SCR MAMMO BI INCL CAD: HCPCS | Mod: TC

## 2017-09-18 LAB
FINAL DIAGNOSIS: NORMAL
HPV HR 12 DNA CVX QL NAA+PROBE: NEGATIVE
HPV16 DNA SPEC QL NAA+PROBE: NEGATIVE
HPV18 DNA SPEC QL NAA+PROBE: NEGATIVE
SPECIMEN DESCRIPTION: NORMAL

## 2017-10-23 ENCOUNTER — TRANSFERRED RECORDS (OUTPATIENT)
Dept: HEALTH INFORMATION MANAGEMENT | Facility: CLINIC | Age: 62
End: 2017-10-23

## 2018-02-12 DIAGNOSIS — E87.6 HYPOKALEMIA: ICD-10-CM

## 2018-02-13 RX ORDER — POTASSIUM CHLORIDE 750 MG/1
TABLET, EXTENDED RELEASE ORAL
Qty: 180 TABLET | Refills: 0 | Status: SHIPPED | OUTPATIENT
Start: 2018-02-13 | End: 2019-08-01

## 2018-02-14 DIAGNOSIS — E87.6 HYPOKALEMIA: ICD-10-CM

## 2018-02-14 RX ORDER — POTASSIUM CHLORIDE 750 MG/1
TABLET, EXTENDED RELEASE ORAL
OUTPATIENT
Start: 2018-02-14

## 2019-07-26 DIAGNOSIS — E87.6 HYPOKALEMIA: ICD-10-CM

## 2019-07-26 NOTE — LETTER
August 1, 2019    Haritha Denton  [address] [address]        Dear Haritha,       We recently received a refill request for potassium chloride ER (KLOR-CON) 10 MEQ CR tablet.  We have not refilled this because you are due for a:    Hypokalemia office visit      Please call at your earliest convenience so that there will not be a delay with your future refills.          Thank you,   Your [facility] Team/[facility]  [phone]

## 2019-07-29 RX ORDER — POTASSIUM CHLORIDE 750 MG/1
TABLET, EXTENDED RELEASE ORAL
Qty: 180 TABLET | Refills: 0 | OUTPATIENT
Start: 2019-07-29

## 2019-07-29 NOTE — TELEPHONE ENCOUNTER
Last office visit is with Dr Luu, 9/12/17    Last Comprehensive Metabolic Panel:  Sodium   Date Value Ref Range Status   09/12/2017 143 133 - 144 mmol/L Final     Potassium   Date Value Ref Range Status   09/12/2017 4.0 3.4 - 5.3 mmol/L Final     Chloride   Date Value Ref Range Status   09/12/2017 110 (H) 94 - 109 mmol/L Final     Carbon Dioxide   Date Value Ref Range Status   09/12/2017 21 20 - 32 mmol/L Final     Anion Gap   Date Value Ref Range Status   09/12/2017 12 3 - 14 mmol/L Final     Glucose   Date Value Ref Range Status   09/12/2017 100 (H) 70 - 99 mg/dL Final     Comment:     Fasting specimen     Urea Nitrogen   Date Value Ref Range Status   09/12/2017 14 7 - 30 mg/dL Final     Creatinine   Date Value Ref Range Status   09/12/2017 0.62 0.52 - 1.04 mg/dL Final     GFR Estimate   Date Value Ref Range Status   09/12/2017 >90 >60 mL/min/1.7m2 Final     Comment:     Non  GFR Calc     Calcium   Date Value Ref Range Status   09/12/2017 8.1 (L) 8.5 - 10.1 mg/dL Final     Please advise on refill.  Krystle Khalil RN

## 2019-07-29 NOTE — TELEPHONE ENCOUNTER
She has not been seen in two years  Have her make an appt and give her just enough meds to get her to her appt.     Roxanne Benjamin

## 2019-07-30 NOTE — TELEPHONE ENCOUNTER
I called the patient and LM including the providers note below.  Clinic number given to make an appointment.  Estefani Silva,

## 2019-08-01 RX ORDER — POTASSIUM CHLORIDE 750 MG/1
TABLET, EXTENDED RELEASE ORAL
Qty: 60 TABLET | Refills: 0 | Status: SHIPPED | OUTPATIENT
Start: 2019-08-01

## 2019-08-01 NOTE — TELEPHONE ENCOUNTER
I called the patient and LM again.  Clinic number given to make an appointment.  Letter mailed.  Estefani Silva,

## 2019-08-16 ENCOUNTER — DOCUMENTATION ONLY (OUTPATIENT)
Dept: FAMILY MEDICINE | Facility: CLINIC | Age: 64
End: 2019-08-16

## 2019-08-16 DIAGNOSIS — E87.6 HYPOKALEMIA: Primary | ICD-10-CM

## 2019-08-16 DIAGNOSIS — Z13.220 SCREENING FOR HYPERLIPIDEMIA: ICD-10-CM

## 2019-08-16 NOTE — PROGRESS NOTES
This patient has a lab only appointment on 8/21/2019 but does not have future orders. Please review, associate diagnosis and sign pending lab orders for the upcoming appointment. The System assigned HIV is due but not ordered.    She has an appointment with Dr. Luu on 8/28/2019.    Thank you,    [facility]

## 2019-08-16 NOTE — PROGRESS NOTES
Please review and sign lab orders.  The System assigned HIV is due but not ordered.  Lab 8/21/9  OV 8/28/19  Estefani Silva,

## 2020-07-20 ENCOUNTER — RECORDS - HEALTHEAST (OUTPATIENT)
Dept: LAB | Facility: CLINIC | Age: 65
End: 2020-07-20

## 2020-07-21 LAB
ANION GAP SERPL CALCULATED.3IONS-SCNC: 8 MMOL/L (ref 5–18)
BASOPHILS # BLD AUTO: 0.1 THOU/UL (ref 0–0.2)
BASOPHILS NFR BLD AUTO: 1 % (ref 0–2)
BUN SERPL-MCNC: 7 MG/DL (ref 8–22)
CALCIUM SERPL-MCNC: 8.6 MG/DL (ref 8.5–10.5)
CHLORIDE BLD-SCNC: 105 MMOL/L (ref 98–107)
CO2 SERPL-SCNC: 24 MMOL/L (ref 22–31)
CREAT SERPL-MCNC: 0.55 MG/DL (ref 0.6–1.1)
EOSINOPHIL # BLD AUTO: 0.2 THOU/UL (ref 0–0.4)
EOSINOPHIL NFR BLD AUTO: 3 % (ref 0–6)
ERYTHROCYTE [DISTWIDTH] IN BLOOD BY AUTOMATED COUNT: 14.9 % (ref 11–14.5)
FOLATE SERPL-MCNC: 10.6 NG/ML
GFR SERPL CREATININE-BSD FRML MDRD: >60 ML/MIN/1.73M2
GLUCOSE BLD-MCNC: 76 MG/DL (ref 70–125)
HCT VFR BLD AUTO: 31.9 % (ref 35–47)
HGB BLD-MCNC: 10.4 G/DL (ref 12–16)
LYMPHOCYTES # BLD AUTO: 2.5 THOU/UL (ref 0.8–4.4)
LYMPHOCYTES NFR BLD AUTO: 31 % (ref 20–40)
MCH RBC QN AUTO: 32.4 PG (ref 27–34)
MCHC RBC AUTO-ENTMCNC: 32.6 G/DL (ref 32–36)
MCV RBC AUTO: 99 FL (ref 80–100)
MONOCYTES # BLD AUTO: 0.9 THOU/UL (ref 0–0.9)
MONOCYTES NFR BLD AUTO: 12 % (ref 2–10)
NEUTROPHILS # BLD AUTO: 4.2 THOU/UL (ref 2–7.7)
NEUTROPHILS NFR BLD AUTO: 52 % (ref 50–70)
PLATELET # BLD AUTO: 332 THOU/UL (ref 140–440)
PMV BLD AUTO: 9.6 FL (ref 8.5–12.5)
POTASSIUM BLD-SCNC: 3.4 MMOL/L (ref 3.5–5)
RBC # BLD AUTO: 3.21 MILL/UL (ref 3.8–5.4)
SODIUM SERPL-SCNC: 137 MMOL/L (ref 136–145)
VIT B12 SERPL-MCNC: 403 PG/ML (ref 213–816)
WBC: 7.9 THOU/UL (ref 4–11)

## 2022-02-17 PROBLEM — K21.9 GASTROESOPHAGEAL REFLUX DISEASE: Status: ACTIVE | Noted: 2017-06-18

## 2022-06-14 ENCOUNTER — LAB REQUISITION (OUTPATIENT)
Dept: LAB | Facility: CLINIC | Age: 67
End: 2022-06-14

## 2022-06-15 LAB
ANION GAP SERPL CALCULATED.3IONS-SCNC: 9 MMOL/L (ref 5–18)
BUN SERPL-MCNC: 8 MG/DL (ref 8–22)
CALCIUM SERPL-MCNC: 8.8 MG/DL (ref 8.5–10.5)
CHLORIDE BLD-SCNC: 106 MMOL/L (ref 98–107)
CO2 SERPL-SCNC: 24 MMOL/L (ref 22–31)
CREAT SERPL-MCNC: 0.61 MG/DL (ref 0.6–1.1)
ERYTHROCYTE [DISTWIDTH] IN BLOOD BY AUTOMATED COUNT: 14.7 % (ref 10–15)
GFR SERPL CREATININE-BSD FRML MDRD: >90 ML/MIN/1.73M2
GLUCOSE BLD-MCNC: 88 MG/DL (ref 70–125)
HCT VFR BLD AUTO: 35.2 % (ref 35–47)
HGB BLD-MCNC: 11.4 G/DL (ref 11.7–15.7)
MCH RBC QN AUTO: 28.9 PG (ref 26.5–33)
MCHC RBC AUTO-ENTMCNC: 32.4 G/DL (ref 31.5–36.5)
MCV RBC AUTO: 89 FL (ref 78–100)
PLATELET # BLD AUTO: 364 10E3/UL (ref 150–450)
POTASSIUM BLD-SCNC: 3.6 MMOL/L (ref 3.5–5)
RBC # BLD AUTO: 3.95 10E6/UL (ref 3.8–5.2)
SODIUM SERPL-SCNC: 139 MMOL/L (ref 136–145)
WBC # BLD AUTO: 8.9 10E3/UL (ref 4–11)

## 2022-06-15 PROCEDURE — P9604 ONE-WAY ALLOW PRORATED TRIP: HCPCS | Performed by: FAMILY MEDICINE

## 2022-06-15 PROCEDURE — 80048 BASIC METABOLIC PNL TOTAL CA: CPT | Performed by: FAMILY MEDICINE

## 2022-06-15 PROCEDURE — 85027 COMPLETE CBC AUTOMATED: CPT | Performed by: FAMILY MEDICINE

## 2022-06-15 PROCEDURE — 36415 COLL VENOUS BLD VENIPUNCTURE: CPT | Performed by: FAMILY MEDICINE
